# Patient Record
Sex: MALE | Race: ASIAN | NOT HISPANIC OR LATINO | URBAN - METROPOLITAN AREA
[De-identification: names, ages, dates, MRNs, and addresses within clinical notes are randomized per-mention and may not be internally consistent; named-entity substitution may affect disease eponyms.]

---

## 2018-10-06 ENCOUNTER — EMERGENCY (EMERGENCY)
Facility: HOSPITAL | Age: 38
LOS: 1 days | Discharge: HOME | End: 2018-10-06
Attending: EMERGENCY MEDICINE | Admitting: EMERGENCY MEDICINE
Payer: MEDICAID

## 2018-10-06 VITALS
HEART RATE: 81 BPM | RESPIRATION RATE: 18 BRPM | SYSTOLIC BLOOD PRESSURE: 141 MMHG | DIASTOLIC BLOOD PRESSURE: 96 MMHG | OXYGEN SATURATION: 100 %

## 2018-10-06 VITALS
HEART RATE: 97 BPM | SYSTOLIC BLOOD PRESSURE: 134 MMHG | OXYGEN SATURATION: 99 % | DIASTOLIC BLOOD PRESSURE: 96 MMHG | RESPIRATION RATE: 16 BRPM | TEMPERATURE: 99 F

## 2018-10-06 PROCEDURE — 73070 X-RAY EXAM OF ELBOW: CPT | Mod: 26,LT

## 2018-10-06 PROCEDURE — 73090 X-RAY EXAM OF FOREARM: CPT | Mod: 26,LT

## 2018-10-06 PROCEDURE — 73110 X-RAY EXAM OF WRIST: CPT | Mod: 26,LT

## 2018-10-06 PROCEDURE — 29105 APPLICATION LONG ARM SPLINT: CPT | Mod: LT

## 2018-10-06 PROCEDURE — 99284 EMERGENCY DEPT VISIT MOD MDM: CPT | Mod: 25

## 2018-10-06 RX ORDER — OXYCODONE AND ACETAMINOPHEN 5; 325 MG/1; MG/1
1 TABLET ORAL ONCE
Qty: 0 | Refills: 0 | Status: COMPLETED | OUTPATIENT
Start: 2018-10-06 | End: 2018-10-06

## 2018-10-06 RX ORDER — IBUPROFEN 200 MG
600 TABLET ORAL ONCE
Qty: 0 | Refills: 0 | Status: COMPLETED | OUTPATIENT
Start: 2018-10-06 | End: 2018-10-06

## 2018-10-06 RX ORDER — ACETAMINOPHEN 500 MG
975 TABLET ORAL ONCE
Qty: 0 | Refills: 0 | Status: COMPLETED | OUTPATIENT
Start: 2018-10-06 | End: 2018-10-06

## 2018-10-06 RX ADMIN — Medication 600 MILLIGRAM(S): at 15:20

## 2018-10-06 RX ADMIN — Medication 600 MILLIGRAM(S): at 14:27

## 2018-10-06 RX ADMIN — Medication 975 MILLIGRAM(S): at 15:19

## 2018-10-06 NOTE — ED PROCEDURE NOTE - CPROC ED POST PROC CARE GUIDE1
Instructed patient/caregiver to follow-up with primary care physician./Instructed patient/caregiver regarding signs and symptoms of infection./Elevate the injured extremity as instructed./Verbal/written post procedure instructions were given to patient/caregiver.

## 2018-10-06 NOTE — ED PROVIDER NOTE - OBJECTIVE STATEMENT
39 y/o M with no pertinent PMHx  present to the ED c/o LT forearm pain s/p mechanical fall today. Patient states he was carrying some boxes, tripped and landed on a metal rail w/ full body weight. Patient feels like he broke his LT arm. Denies headache, neck pain, nausea/vomiting, numbness, weakness, chest pain or any other related symptoms. 37 y/o M with no pertinent PMHx  present to the ED c/o LT forearm pain s/p mechanical fall today. Patient states he was carrying some boxes, tripped and landed on a metal rail w/ full body weight. Patient feels like he broke his LT arm. Denies headache, neck pain, nausea/vomiting, numbness, weakness, chest pain or any other related symptoms. no numbness or weakness. Right hand dominant.

## 2018-10-06 NOTE — ED PROVIDER NOTE - PHYSICAL EXAMINATION
pt ambulatory, no distress. no sign of head/facial trauma. supple neck.   left arm in a sling. nontender left shoulder. nl rom. no deformity of left arm. nontender left elbow and left wrist. tender left forearm. nl  left hand. nl rom. no snuff box tenderness of left hand.   nontender chest wall. No resp distress. able to speak in full and clear sentences. no wheeze, rales or stridor. normal S1-S2. nontender right arm/hand.

## 2018-10-06 NOTE — ED PROVIDER NOTE - PROGRESS NOTE DETAILS
pt no distress. no pain. informed a fracture noted on xray. pending read of xray. spoke w ortho resident on call. spoke w ortho resident on call Dr Mahmood, reviewed xray. pt may go home with a splint and outpt followup with ortho. pt given copies of xray and ortho list. pt aware, comfortable w plan. pain controlled. work note provided. spoke w ortho resident on call Dr Mahmood, reviewed xray. pt may go home with a splint and a sling, and outpt followup with ortho. pt given copies of xray and ortho list. pt aware, comfortable w plan. pain controlled. work note provided.   please see procedure note.

## 2022-05-18 NOTE — ED PROCEDURE NOTE - NS ED ATTENDING NAME FT
Lee Albendazole Counseling:  I discussed with the patient the risks of albendazole including but not limited to cytopenia, kidney damage, nausea/vomiting and severe allergy.  The patient understands that this medication is being used in an off-label manner.